# Patient Record
Sex: MALE | Race: WHITE
[De-identification: names, ages, dates, MRNs, and addresses within clinical notes are randomized per-mention and may not be internally consistent; named-entity substitution may affect disease eponyms.]

---

## 2023-01-20 ENCOUNTER — APPOINTMENT (OUTPATIENT)
Dept: PEDIATRIC ORTHOPEDIC SURGERY | Facility: CLINIC | Age: 33
End: 2023-01-20
Payer: COMMERCIAL

## 2023-01-20 VITALS
WEIGHT: 170 LBS | SYSTOLIC BLOOD PRESSURE: 122 MMHG | HEIGHT: 69 IN | BODY MASS INDEX: 25.18 KG/M2 | DIASTOLIC BLOOD PRESSURE: 80 MMHG | TEMPERATURE: 98.2 F

## 2023-01-20 DIAGNOSIS — S43.422A SPRAIN OF LEFT ROTATOR CUFF CAPSULE, INITIAL ENCOUNTER: ICD-10-CM

## 2023-01-20 DIAGNOSIS — Z87.19 PERSONAL HISTORY OF OTHER DISEASES OF THE DIGESTIVE SYSTEM: ICD-10-CM

## 2023-01-20 PROBLEM — Z00.00 ENCOUNTER FOR PREVENTIVE HEALTH EXAMINATION: Status: ACTIVE | Noted: 2023-01-20

## 2023-01-20 PROCEDURE — 99202 OFFICE O/P NEW SF 15 MIN: CPT

## 2023-01-20 PROCEDURE — 73030 X-RAY EXAM OF SHOULDER: CPT

## 2023-01-20 NOTE — HISTORY OF PRESENT ILLNESS
[FreeTextEntry1] : This 32-year-old male is here for evaluation of an injury sustained to the left shoulder 1 month ago while he was swinging a golf club.  He heard a noise in the shoulder and since then has had some intermittent pain.  It is slowly improving over time.  He does not have a feeling of instability.  He has had no previous injury to the shoulder.  He does not feel any neurovascular symptoms.

## 2023-01-20 NOTE — ASSESSMENT
[FreeTextEntry1] : Left shoulder sprain\par \par This patient will be observed.  No active treatment will be rendered at this time.

## 2023-01-20 NOTE — DATA REVIEWED
[de-identified] : X-ray evaluation of the left shoulder on 1/20/2023 (AP and lateral views) reveals no obvious abnormalities.\par Indication for shoulder x-ray: To determine presence of fracture or subluxation

## 2023-01-20 NOTE — PHYSICAL EXAM
[FreeTextEntry1] : On physical examination there is a full range of motion of the cervical spine.  The patient has no tenderness of the left shoulder at this time.  He can perform a full range of motion of the shoulder.  There is a negative anterior and posterior apprehension sign and a negative sulcus sign.  The neurovascular status of the left upper extremity is intact.